# Patient Record
Sex: FEMALE | Race: WHITE | NOT HISPANIC OR LATINO
[De-identification: names, ages, dates, MRNs, and addresses within clinical notes are randomized per-mention and may not be internally consistent; named-entity substitution may affect disease eponyms.]

---

## 2018-11-27 ENCOUNTER — APPOINTMENT (OUTPATIENT)
Dept: ORTHOPEDIC SURGERY | Facility: CLINIC | Age: 78
End: 2018-11-27
Payer: MEDICARE

## 2018-11-27 PROCEDURE — 99213 OFFICE O/P EST LOW 20 MIN: CPT

## 2018-11-27 PROCEDURE — 73562 X-RAY EXAM OF KNEE 3: CPT | Mod: 50

## 2018-11-27 RX ORDER — DICLOFENAC SODIUM 10 MG/G
1 GEL TOPICAL DAILY
Qty: 2 | Refills: 2 | Status: ACTIVE | COMMUNITY
Start: 2018-11-27 | End: 1900-01-01

## 2018-12-03 VITALS
WEIGHT: 169 LBS | SYSTOLIC BLOOD PRESSURE: 166 MMHG | BODY MASS INDEX: 28.85 KG/M2 | HEART RATE: 61 BPM | HEIGHT: 64 IN | DIASTOLIC BLOOD PRESSURE: 78 MMHG

## 2018-12-03 RX ORDER — APIXABAN 5 MG/1
TABLET, FILM COATED ORAL
Refills: 0 | Status: ACTIVE | COMMUNITY

## 2018-12-03 RX ORDER — DRONEDARONE 400 MG/1
TABLET, FILM COATED ORAL
Refills: 0 | Status: ACTIVE | COMMUNITY

## 2018-12-03 RX ORDER — INDAPAMIDE 1.25 MG/1
TABLET, FILM COATED ORAL
Refills: 0 | Status: ACTIVE | COMMUNITY

## 2018-12-03 RX ORDER — AMLODIPINE BESYLATE 5 MG/1
TABLET ORAL
Refills: 0 | Status: ACTIVE | COMMUNITY

## 2021-07-13 ENCOUNTER — APPOINTMENT (OUTPATIENT)
Dept: ORTHOPEDIC SURGERY | Facility: CLINIC | Age: 81
End: 2021-07-13
Payer: MEDICARE

## 2021-07-13 VITALS
BODY MASS INDEX: 28.8 KG/M2 | DIASTOLIC BLOOD PRESSURE: 86 MMHG | SYSTOLIC BLOOD PRESSURE: 129 MMHG | HEART RATE: 101 BPM | HEIGHT: 64 IN | WEIGHT: 168.7 LBS

## 2021-07-13 DIAGNOSIS — Z96.651 PRESENCE OF RIGHT ARTIFICIAL KNEE JOINT: ICD-10-CM

## 2021-07-13 PROCEDURE — 73562 X-RAY EXAM OF KNEE 3: CPT | Mod: 50

## 2021-07-13 PROCEDURE — 99213 OFFICE O/P EST LOW 20 MIN: CPT

## 2022-05-12 ENCOUNTER — APPOINTMENT (OUTPATIENT)
Dept: ORTHOPEDIC SURGERY | Facility: CLINIC | Age: 82
End: 2022-05-12
Payer: MEDICARE

## 2022-05-12 VITALS — HEIGHT: 64 IN | WEIGHT: 153 LBS | BODY MASS INDEX: 26.12 KG/M2

## 2022-05-12 DIAGNOSIS — S32.592D OTHER SPECIFIED FRACTURE OF LEFT PUBIS, SUBSEQUENT ENCOUNTER FOR FRACTURE WITH ROUTINE HEALING: ICD-10-CM

## 2022-05-12 PROCEDURE — 99213 OFFICE O/P EST LOW 20 MIN: CPT

## 2022-05-12 NOTE — HISTORY OF PRESENT ILLNESS
[5] : 5 [Burning] : burning [Dull/Aching] : dull/aching [Intermittent] : intermittent [Heat] : heat [Standing] : standing [Walking] : walking [] : no [FreeTextEntry1] : left pelvis [FreeTextEntry5] : pt reports her pain has improved.

## 2023-01-03 ENCOUNTER — APPOINTMENT (OUTPATIENT)
Dept: ORTHOPEDIC SURGERY | Facility: CLINIC | Age: 83
End: 2023-01-03
Payer: MEDICARE

## 2023-01-03 VITALS — WEIGHT: 153 LBS | BODY MASS INDEX: 26.12 KG/M2 | HEIGHT: 64 IN

## 2023-01-03 DIAGNOSIS — Z91.81 HISTORY OF FALLING: ICD-10-CM

## 2023-01-03 DIAGNOSIS — S20.212A CONTUSION OF LEFT FRONT WALL OF THORAX, INITIAL ENCOUNTER: ICD-10-CM

## 2023-01-03 DIAGNOSIS — S20.219A CONTUSION OF UNSPECIFIED FRONT WALL OF THORAX, INITIAL ENCOUNTER: ICD-10-CM

## 2023-01-03 DIAGNOSIS — S22.42XA MULTIPLE FRACTURES OF RIBS, LEFT SIDE, INITIAL ENCOUNTER FOR CLOSED FRACTURE: ICD-10-CM

## 2023-01-03 PROCEDURE — 99213 OFFICE O/P EST LOW 20 MIN: CPT

## 2023-01-03 PROCEDURE — 71100 X-RAY EXAM RIBS UNI 2 VIEWS: CPT | Mod: LT

## 2023-01-03 PROCEDURE — 72070 X-RAY EXAM THORAC SPINE 2VWS: CPT

## 2023-01-03 PROCEDURE — 99203 OFFICE O/P NEW LOW 30 MIN: CPT

## 2023-01-03 NOTE — HISTORY OF PRESENT ILLNESS
[8] : 8 [Dull/Aching] : dull/aching [Sharp] : sharp [Intermittent] : intermittent [Sleep] : sleep [Nothing helps with pain getting better] : Nothing helps with pain getting better [de-identified] : 1/3/23:  82 year old F presents for LEFT sided mid back and rib pain since she tripped and fell on lift in concrete 12/25/22. She has pleuritic pain.  Radiates to shoulder blade. She applied ice to her left side for swelling. No numbness or tingling.  Tylenol qhs. Pain sleeping on her side. Similar episode rib injury 5 years ago s/p fall at granddaughter's school. \par She is on Eloquis for cardiac issues. \par \par PMHx:\par Cardiac, no DM, non smoker [] : no [FreeTextEntry1] : RIb  [FreeTextEntry5] : Slip and fall last week. Pt did not go to the hospital. Pt has pain when breathing

## 2023-01-03 NOTE — PHYSICAL EXAM
[No bony abnormalities] : No bony abnormalities [Fracture] : Fracture [] : no swelling [FreeTextEntry3] : kyphosis [FreeTextEntry1] : old compression fx/kyphoplasty [FreeTextEntry5] : fx 7,8,9 [FreeTextEntry8] : pain end of FE to left costal margin, tenderness to palpation L infrascapula border, along rib 6-7-8, pain with AP/Lat chest excursion.

## 2023-01-03 NOTE — ASSESSMENT
[FreeTextEntry1] : Limit bending, twisting, lifting. Deep breathing hourly while awake. Tylenol for pain

## 2023-02-07 ENCOUNTER — APPOINTMENT (OUTPATIENT)
Dept: ORTHOPEDIC SURGERY | Facility: CLINIC | Age: 83
End: 2023-02-07
Payer: MEDICARE

## 2023-02-07 VITALS — BODY MASS INDEX: 26.12 KG/M2 | WEIGHT: 153 LBS | HEIGHT: 64 IN

## 2023-02-07 PROCEDURE — 99213 OFFICE O/P EST LOW 20 MIN: CPT

## 2023-02-07 PROCEDURE — 71100 X-RAY EXAM RIBS UNI 2 VIEWS: CPT | Mod: LT

## 2023-02-07 NOTE — PHYSICAL EXAM
[No bony abnormalities] : No bony abnormalities [Fracture] : Fracture [] : no swelling [FreeTextEntry3] : kyphosis [FreeTextEntry5] : fx 7,8,9- no callous yet

## 2023-02-07 NOTE — HISTORY OF PRESENT ILLNESS
[Dull/Aching] : dull/aching [Sharp] : sharp [Intermittent] : intermittent [Sleep] : sleep [Nothing helps with pain getting better] : Nothing helps with pain getting better [8] : 8 [de-identified] : 1/3/23:  82 year old F presents for LEFT sided mid back and rib pain since she tripped and fell on lift in concrete 12/25/22. She has pleuritic pain.  Radiates to shoulder blade. She applied ice to her left side for swelling. No numbness or tingling.  Tylenol qhs. Pain sleeping on her side. Similar episode rib injury 5 years ago s/p fall at granddaughter's school. \par She is on Eloquis for cardiac issues. \par \par PMHx:\par Cardiac, no DM, non smoker [] : no [FreeTextEntry1] : RIb  [FreeTextEntry5] : Slip and fall last week. Pt did not go to the hospital. Pt has pain when breathing

## 2023-03-09 ENCOUNTER — APPOINTMENT (OUTPATIENT)
Dept: ORTHOPEDIC SURGERY | Facility: CLINIC | Age: 83
End: 2023-03-09
Payer: MEDICARE

## 2023-03-09 VITALS — BODY MASS INDEX: 26.12 KG/M2 | WEIGHT: 153 LBS | HEIGHT: 64 IN

## 2023-03-09 PROCEDURE — 71100 X-RAY EXAM RIBS UNI 2 VIEWS: CPT | Mod: LT

## 2023-03-09 PROCEDURE — 99213 OFFICE O/P EST LOW 20 MIN: CPT

## 2023-03-09 NOTE — REASON FOR VISIT
[FreeTextEntry2] : 3/9/23- f/u left rib fractures, improving\par 2/7/23- f/u fx left ribs, improving from a pain perspective

## 2023-03-09 NOTE — HISTORY OF PRESENT ILLNESS
[0] : 0 [8] : 8 [Dull/Aching] : dull/aching [Sharp] : sharp [Intermittent] : intermittent [Sleep] : sleep [Nothing helps with pain getting better] : Nothing helps with pain getting better [de-identified] : 1/3/23:  82 year old F presents for LEFT sided mid back and rib pain since she tripped and fell on lift in concrete 12/25/22. She has pleuritic pain.  Radiates to shoulder blade. She applied ice to her left side for swelling. No numbness or tingling.  Tylenol qhs. Pain sleeping on her side. Similar episode rib injury 5 years ago s/p fall at granddaughter's school. \par She is on Eloquis for cardiac issues. \par \par PMHx:\par Cardiac, no DM, non smoker [] : no [FreeTextEntry1] : RIb  [FreeTextEntry5] : Slip and fall last week. Pt did not go to the hospital. Pt has pain when breathing

## 2023-03-09 NOTE — PHYSICAL EXAM
[Fracture] : Fracture [] : no swelling [FreeTextEntry3] : kyphosis [FreeTextEntry5] : fx 7,8,9- early callous

## 2023-04-20 ENCOUNTER — APPOINTMENT (OUTPATIENT)
Dept: ORTHOPEDIC SURGERY | Facility: CLINIC | Age: 83
End: 2023-04-20
Payer: MEDICARE

## 2023-04-20 VITALS — HEIGHT: 64 IN | BODY MASS INDEX: 26.12 KG/M2 | WEIGHT: 153 LBS

## 2023-04-20 DIAGNOSIS — S22.42XD MULTIPLE FRACTURES OF RIBS, LEFT SIDE, SUBSEQUENT ENCOUNTER FOR FRACTURE WITH ROUTINE HEALING: ICD-10-CM

## 2023-04-20 PROCEDURE — 71100 X-RAY EXAM RIBS UNI 2 VIEWS: CPT | Mod: LT

## 2023-04-20 PROCEDURE — 99213 OFFICE O/P EST LOW 20 MIN: CPT

## 2023-04-20 NOTE — HISTORY OF PRESENT ILLNESS
[8] : 8 [0] : 0 [Dull/Aching] : dull/aching [Sharp] : sharp [Intermittent] : intermittent [Sleep] : sleep [Nothing helps with pain getting better] : Nothing helps with pain getting better [de-identified] : 1/3/23:  82 year old F presents for LEFT sided mid back and rib pain since she tripped and fell on lift in concrete 12/25/22. She has pleuritic pain.  Radiates to shoulder blade. She applied ice to her left side for swelling. No numbness or tingling.  Tylenol qhs. Pain sleeping on her side. Similar episode rib injury 5 years ago s/p fall at granddaughter's school. \par She is on Eloquis for cardiac issues. \par \par PMHx:\par Cardiac, no DM, non smoker [] : no [FreeTextEntry1] : RIb  [FreeTextEntry5] : Slip and fall last week. Pt did not go to the hospital. Pt has pain when breathing

## 2023-04-20 NOTE — PHYSICAL EXAM
[] : intact skin [Fracture] : Fracture [FreeTextEntry3] : kyphosis [FreeTextEntry8] : left ribs non tender [FreeTextEntry5] : fx 7,8,9- healed

## 2023-04-20 NOTE — REASON FOR VISIT
[FreeTextEntry2] : 4/20/23- Left ribs doing better\par 3/9/23- f/u left rib fractures, improving\par 2/7/23- f/u fx left ribs, improving from a pain perspective

## 2023-07-20 ENCOUNTER — APPOINTMENT (OUTPATIENT)
Dept: ORTHOPEDIC SURGERY | Facility: CLINIC | Age: 83
End: 2023-07-20
Payer: MEDICARE

## 2023-07-20 VITALS — SYSTOLIC BLOOD PRESSURE: 121 MMHG | DIASTOLIC BLOOD PRESSURE: 82 MMHG | HEART RATE: 66 BPM

## 2023-07-20 DIAGNOSIS — Z96.652 PRESENCE OF LEFT ARTIFICIAL KNEE JOINT: ICD-10-CM

## 2023-07-20 DIAGNOSIS — Z96.651 PRESENCE OF RIGHT ARTIFICIAL KNEE JOINT: ICD-10-CM

## 2023-07-20 PROCEDURE — 99214 OFFICE O/P EST MOD 30 MIN: CPT

## 2023-07-20 PROCEDURE — 73562 X-RAY EXAM OF KNEE 3: CPT | Mod: 50

## 2023-07-20 NOTE — PHYSICAL EXAM
[Antalgic] : antalgic [Knee Swelling Right] : no swelling [Knee Tenderness On Palpation Right] : tenderness [Knee Motion Right Crepitus] : no crepitus with ROM [Knee Motion Right] : full ROM [Knee Anterior Drawer Sign Right] : negative anterior drawer sign [Knee Posterior Drawer Sign Right] : negative posterior drawer sign [Knee Medial Instability Right] : no laxity on valgus stress [Knee Lateral Instability Right] : no laxity on varus stress [Knee Swelling Left] : swelling [Knee Tenderness On Palpation Left] : tenderness [Knee Motion Left Crepitus] : no crepitus with ROM [Knee Motion Left] : limited ROM [Knee Anterior Drawer Sign Left] : negative anterior drawer sign [Knee Posterior Drawer Sign Left] : negative posterior drawer sign [Knee Medial Instability Left] : no laxity on valgus stress [Knee Lateral Instability Left] : no  laxity on varus stress [de-identified] : Patient is AO X 3 , not in any acute distress. Walks with mild antalgic gait without using any assistive devices. \par Right knee with mild swelling, no ecchymosis. Mild joint tenderness to the lateral aspect of the knee, no effusion. Knee flexes to 110 degrees without any pain or stiffness. No ligament laxity. Able to extent the knee , no quadriceps weakness. Sensation intact and no calf tenderness. \par Left knee with moderate amount of swelling and ecchymosis on the knee. TTP along the joint line. Flexes to 110 , terminal flexion with pain, able to extent the knee. No ligament laxity. No quadriceps weakness. Sensation intact. No calf tenderness.

## 2023-07-20 NOTE — DISCUSSION/SUMMARY
[Medication Risks Reviewed] : Medication risks reviewed [2 Weeks] : in 2 weeks [de-identified] : X rays results reviewed with the patient. Exam results reviewed. B/L knee with no acute fracture or dislocation. Patients symptoms most likely from the soft tissue injury. Patient reports chronic kidney disease and not able to take any NSAIDS. She has been using tylenol with some relief. Advised to continue with ICE and tylenol ATC and rest. Patient to do activity modification and rest for the week. She is not interested in PT since it gave her back pain. Patient is advised to follow up with the office in 2 weeks if symptoms does not improve. Patient verbalize understanding. \par \par My cumulative time spent on this patient's visit included: Preparation for the visit, review of the medical records, review of pertinent diagnostic studies, examination and counseling of the patient on the above diagnosis, treatment plan and prognosis, orders of diagnostic tests, medications and/or appropriate procedures and documentation in the medical records of today's visit. \par Not including time spent for procedures

## 2023-07-20 NOTE — REASON FOR VISIT
[Follow-Up Visit] : a follow-up visit for [Knee Pain] : knee pain [Joint Replacement Surgery, Aftercare] : joint replacement surgery, aftercare [FreeTextEntry2] :  Bilateral knee pain due to fall this past Tuesday, S/P Right knee TKR DOS 10/21/13, Left knee TKR DOS 7/21/14

## 2023-07-20 NOTE — HISTORY OF PRESENT ILLNESS
[de-identified] : Patient is a 82 yr old female presented to the office with c/o b/l knee pain. Patient is s/p TKR 10/21/2013 and Lt TKR on 7/21/14. Patient was going up the stairs to her home when tripped and fell. Patient report she is not able to lift her leg properly after the knee surgeries. Report swelling to b/l knees left more than right. Ecchymosis to left knee. Reports decreased ROM to the left knee. Denies any instability or any issues bearing weight.  [Improving] : improving [___ days] : [unfilled] day(s) ago [3] : a current pain level of 3/10 [Standing] : standing [Intermit.] : ~He/She~ states the symptoms seem to be intermittent [Bending] : worsened by bending [Direct Pressure] : worsened by direct pressure [Sitting] : worsened by sitting [Walking] : worsened by walking [Knee Flexion] : worsened with knee flexion [Knee Extension] : not worsened by knee extension [Acetaminophen] : relieved by acetaminophen

## 2024-06-19 ENCOUNTER — APPOINTMENT (OUTPATIENT)
Dept: MRI IMAGING | Facility: CLINIC | Age: 84
End: 2024-06-19

## 2024-06-19 PROCEDURE — 70551 MRI BRAIN STEM W/O DYE: CPT | Mod: MH

## 2024-06-19 PROCEDURE — 0866T QUAN MRI ALYS BRN W/DX MRI: CPT

## 2024-08-01 ENCOUNTER — APPOINTMENT (OUTPATIENT)
Dept: ULTRASOUND IMAGING | Facility: CLINIC | Age: 84
End: 2024-08-01
Payer: MEDICARE

## 2024-08-01 PROCEDURE — 76536 US EXAM OF HEAD AND NECK: CPT
